# Patient Record
Sex: MALE | Race: WHITE | HISPANIC OR LATINO | Employment: OTHER | URBAN - METROPOLITAN AREA
[De-identification: names, ages, dates, MRNs, and addresses within clinical notes are randomized per-mention and may not be internally consistent; named-entity substitution may affect disease eponyms.]

---

## 2023-04-18 ENCOUNTER — OFFICE VISIT (OUTPATIENT)
Dept: UROLOGY | Facility: CLINIC | Age: 43
End: 2023-04-18

## 2023-04-18 VITALS
HEART RATE: 69 BPM | BODY MASS INDEX: 32.79 KG/M2 | WEIGHT: 229.06 LBS | SYSTOLIC BLOOD PRESSURE: 144 MMHG | DIASTOLIC BLOOD PRESSURE: 77 MMHG | HEIGHT: 70 IN

## 2023-04-18 DIAGNOSIS — E34.9 TESTOSTERONE DEFICIENCY: Primary | ICD-10-CM

## 2023-04-18 DIAGNOSIS — N52.9 VASCULOGENIC ERECTILE DYSFUNCTION, UNSPECIFIED VASCULOGENIC ERECTILE DYSFUNCTION TYPE: ICD-10-CM

## 2023-04-18 PROCEDURE — 99999 PR PBB SHADOW E&M-NEW PATIENT-LVL III: ICD-10-PCS | Mod: PBBFAC,,, | Performed by: UROLOGY

## 2023-04-18 PROCEDURE — 99204 PR OFFICE/OUTPT VISIT, NEW, LEVL IV, 45-59 MIN: ICD-10-PCS | Mod: S$PBB,,, | Performed by: UROLOGY

## 2023-04-18 PROCEDURE — 99203 OFFICE O/P NEW LOW 30 MIN: CPT | Mod: PBBFAC,PN | Performed by: UROLOGY

## 2023-04-18 PROCEDURE — 99999 PR PBB SHADOW E&M-NEW PATIENT-LVL III: CPT | Mod: PBBFAC,,, | Performed by: UROLOGY

## 2023-04-18 PROCEDURE — 99204 OFFICE O/P NEW MOD 45 MIN: CPT | Mod: S$PBB,,, | Performed by: UROLOGY

## 2023-04-18 RX ORDER — SYRINGE, DISPOSABLE, 3 ML
SYRINGE, EMPTY DISPOSABLE MISCELLANEOUS
Qty: 12 EACH | Refills: 4 | Status: SHIPPED | OUTPATIENT
Start: 2023-04-18 | End: 2023-04-19 | Stop reason: SDUPTHER

## 2023-04-18 RX ORDER — TESTOSTERONE CYPIONATE 200 MG/ML
200 INJECTION, SOLUTION INTRAMUSCULAR
Qty: 2 ML | Refills: 3 | Status: SHIPPED | OUTPATIENT
Start: 2023-04-18 | End: 2023-05-15 | Stop reason: SDUPTHER

## 2023-04-18 RX ORDER — NEEDLES, DISPOSABLE 23GX1 1/2"
NEEDLE, DISPOSABLE MISCELLANEOUS
Qty: 12 EACH | Refills: 4 | Status: SHIPPED | OUTPATIENT
Start: 2023-04-18 | End: 2023-04-19 | Stop reason: SDUPTHER

## 2023-04-18 RX ORDER — NEEDLES, DISPOSABLE 25GX5/8"
NEEDLE, DISPOSABLE MISCELLANEOUS
Qty: 12 EACH | Refills: 4 | Status: SHIPPED | OUTPATIENT
Start: 2023-04-18 | End: 2023-04-19 | Stop reason: SDUPTHER

## 2023-04-18 RX ORDER — SILDENAFIL 100 MG/1
TABLET, FILM COATED ORAL
Qty: 30 TABLET | Refills: 6 | Status: SHIPPED | OUTPATIENT
Start: 2023-04-18

## 2023-04-18 NOTE — PROGRESS NOTES
"  CC: low T    History of Present Illness:   Marcelo Mitchell is a 42 y.o. male here for evaluation of low T  .   4/18/23- 43yo male, here for evaluation of low T. He had a recent low level draw by his PCP of 178.   Reports that he drives a lot and doesn't exercise as much as he used to. Libido is good, but erections are not great. Also having Premature ejaculation. Hasn't tried anything. He has started fenofibrate for high tryglycerides.     Review of Systems   Constitutional:  Negative for chills and fever.   Respiratory:  Negative for shortness of breath.    Cardiovascular:  Negative for chest pain.   Gastrointestinal:  Negative for abdominal pain.   All other systems reviewed and are negative.      Past Medical History:   Diagnosis Date    Hypertriglyceridemia        History reviewed. No pertinent surgical history.    Family History   Problem Relation Age of Onset    Hypertension Mother        Social History     Tobacco Use    Smoking status: Some Days     Packs/day: 0.50     Types: Cigarettes   Substance Use Topics    Alcohol use: Yes     Comment: once a week    Drug use: Never       Current Outpatient Medications   Medication Sig Dispense Refill    needle, disp, 18 G (HYPODERMIC NEEDLES) 18 gauge x 1 1/2" Ndle Use as directed 12 each 4    needle, disp, 23 gauge (EASY TOUCH HYPODERMIC NEEDLE) 23 gauge x 1 1/2" Ndle Use as directed 12 each 4    sildenafiL (VIAGRA) 100 MG tablet Take 1 po 1 hour before intercourse 30 tablet 6    syringe, disposable, 3 mL Syrg Use as directed 12 each 4    testosterone cypionate (DEPOTESTOTERONE CYPIONATE) 200 mg/mL injection Inject 1 mL (200 mg total) into the muscle every 14 (fourteen) days. 2 mL 3     No current facility-administered medications for this visit.       Review of patient's allergies indicates:  No Known Allergies    Physical Exam  Vitals:    04/18/23 1505   BP: (!) 144/77   Pulse: 69       General: Well-developed, well-nourished in no acute distress  HEENT: " Normocephalic, atraumatic, Extraocular movements intact  Neck: supple, trachea midline, no cervical or supraclavicular lymphadenopathy  Respirations: even and unlabored  Back: midline spine, no CVA tenderness  Abdomen: soft, Non-tender, non-distended, no organomegaly or palpable masses, no rebound or guarding  : uncircumcised male phallus without lesions, orthotopic urethral meatus, no inguinal hernia, no inguinal lymphadenopathy, no scrotal lesions, testicles descended bilaterally without mass or tenderness  Extremities: atraumatic, moves all equally, no clubbing, cyanosis or edema  Psych: normal affect  Skin: warm and dry, no lesions  Neuro: Alert and oriented, Cranial nerves II-XII grossly intact      Assessment:   1. Testosterone deficiency  Prostate Specific Antigen, Diagnostic    Prolactin    Luteinizing Hormone    CBC Without Differential    Comprehensive Metabolic Panel    Prostate Specific Antigen, Diagnostic    Testosterone    Lipid Panel      2. Vasculogenic erectile dysfunction, unspecified vasculogenic erectile dysfunction type            Plan:  Testosterone deficiency  -     Prostate Specific Antigen, Diagnostic; Future; Expected date: 04/18/2023  -     Prolactin; Future; Expected date: 04/18/2023  -     Luteinizing Hormone; Future; Expected date: 04/18/2023  -     CBC Without Differential; Future; Expected date: 07/13/2023  -     Comprehensive Metabolic Panel; Future; Expected date: 07/13/2023  -     Prostate Specific Antigen, Diagnostic; Future; Expected date: 07/13/2023  -     Testosterone; Future; Expected date: 07/13/2023  -     Lipid Panel; Future; Expected date: 07/13/2023    Vasculogenic erectile dysfunction, unspecified vasculogenic erectile dysfunction type    Other orders  -     testosterone cypionate (DEPOTESTOTERONE CYPIONATE) 200 mg/mL injection; Inject 1 mL (200 mg total) into the muscle every 14 (fourteen) days.  Dispense: 2 mL; Refill: 3  -     syringe, disposable, 3 mL Syrg; Use as  "directed  Dispense: 12 each; Refill: 4  -     needle, disp, 18 G (HYPODERMIC NEEDLES) 18 gauge x 1 1/2" Ndle; Use as directed  Dispense: 12 each; Refill: 4  -     needle, disp, 23 gauge (EASY TOUCH HYPODERMIC NEEDLE) 23 gauge x 1 1/2" Ndle; Use as directed  Dispense: 12 each; Refill: 4  -     sildenafiL (VIAGRA) 100 MG tablet; Take 1 po 1 hour before intercourse  Dispense: 30 tablet; Refill: 6    I had a detailed discussion with the patient, reviewing the diagnosis and management of testosterone deficiency. Also explained that addressing his low T would require routine lab testing to ensure that his blood counts remain stable. We discussed the specific risks/benefits of each type of exogenous testosterone, including the risk of transference with topical treatments. We discussed the data as it relates to cardiovascular health and risk. We also discussed that while there is no evidence linking testosterone therapy to the development of prostate cancer, there could be risk in an individual with prostate cancer, as it relates to prostate cancer growth.       Follow up in about 3 months (around 7/18/2023) for labs before visit.                  "

## 2023-04-19 ENCOUNTER — TELEPHONE (OUTPATIENT)
Dept: UROLOGY | Facility: CLINIC | Age: 43
End: 2023-04-19

## 2023-04-19 ENCOUNTER — LAB VISIT (OUTPATIENT)
Dept: LAB | Facility: HOSPITAL | Age: 43
End: 2023-04-19
Attending: UROLOGY

## 2023-04-19 DIAGNOSIS — E34.9 TESTOSTERONE DEFICIENCY: ICD-10-CM

## 2023-04-19 PROCEDURE — 83002 ASSAY OF GONADOTROPIN (LH): CPT | Performed by: UROLOGY

## 2023-04-19 PROCEDURE — 84153 ASSAY OF PSA TOTAL: CPT | Performed by: UROLOGY

## 2023-04-19 PROCEDURE — 36415 COLL VENOUS BLD VENIPUNCTURE: CPT | Mod: PN | Performed by: UROLOGY

## 2023-04-19 PROCEDURE — 85027 COMPLETE CBC AUTOMATED: CPT | Performed by: UROLOGY

## 2023-04-19 PROCEDURE — 84146 ASSAY OF PROLACTIN: CPT | Performed by: UROLOGY

## 2023-04-19 RX ORDER — NEEDLES, DISPOSABLE 23GX1 1/2"
NEEDLE, DISPOSABLE MISCELLANEOUS
Qty: 12 EACH | Refills: 4 | Status: SHIPPED | OUTPATIENT
Start: 2023-04-19

## 2023-04-19 RX ORDER — SYRINGE, DISPOSABLE, 3 ML
SYRINGE, EMPTY DISPOSABLE MISCELLANEOUS
Qty: 12 EACH | Refills: 4 | Status: SHIPPED | OUTPATIENT
Start: 2023-04-19

## 2023-04-19 RX ORDER — NEEDLES, DISPOSABLE 25GX5/8"
NEEDLE, DISPOSABLE MISCELLANEOUS
Qty: 12 EACH | Refills: 4 | Status: SHIPPED | OUTPATIENT
Start: 2023-04-19

## 2023-04-19 NOTE — TELEPHONE ENCOUNTER
----- Message from Krista Gillette LPN sent at 4/19/2023  4:37 PM CDT -----  Contact: Martha/ Daughter  Patient needs needles to be sent in to pharmacy.  He has the medication.  ----- Message -----  From: Marie Kerr  Sent: 4/19/2023   2:48 PM CDT  To: Charles Thomas stated the patient didn't get the Hyperdermic needles like he needed. Please call and advise at [phone]. She stated without them he cannot take this Testosterone.

## 2023-04-20 ENCOUNTER — TELEPHONE (OUTPATIENT)
Dept: UROLOGY | Facility: CLINIC | Age: 43
End: 2023-04-20

## 2023-04-20 LAB
COMPLEXED PSA SERPL-MCNC: 0.33 NG/ML (ref 0–4)
ERYTHROCYTE [DISTWIDTH] IN BLOOD BY AUTOMATED COUNT: 12 % (ref 11.5–14.5)
HCT VFR BLD AUTO: 45.8 % (ref 40–54)
HGB BLD-MCNC: 14.8 G/DL (ref 14–18)
LH SERPL-ACNC: 3.1 MIU/ML (ref 0.6–12.1)
MCH RBC QN AUTO: 29 PG (ref 27–31)
MCHC RBC AUTO-ENTMCNC: 32.3 G/DL (ref 32–36)
MCV RBC AUTO: 90 FL (ref 82–98)
PLATELET # BLD AUTO: 282 K/UL (ref 150–450)
PMV BLD AUTO: 12.6 FL (ref 9.2–12.9)
PROLACTIN SERPL IA-MCNC: 7.7 NG/ML (ref 3.5–19.4)
RBC # BLD AUTO: 5.11 M/UL (ref 4.6–6.2)
WBC # BLD AUTO: 9.29 K/UL (ref 3.9–12.7)

## 2023-04-20 NOTE — TELEPHONE ENCOUNTER
----- Message from Franchesca Soto MD sent at 4/19/2023  5:21 PM CDT -----  Contact: Martha/ Daughter  I just sent the needles again.   ----- Message -----  From: Krista Gillette LPN  Sent: 4/19/2023   4:38 PM CDT  To: Franchesca Soto MD    Patient needs needles to be sent in to pharmacy.  He has the medication.  ----- Message -----  From: Mraie Kerr  Sent: 4/19/2023   2:48 PM CDT  To: Charles Agosto    Martha stated the patient didn't get the Hyperdermic needles like he needed. Please call and advise at [phone]. She stated without them he cannot take this Testosterone.

## 2023-05-16 RX ORDER — TESTOSTERONE CYPIONATE 200 MG/ML
200 INJECTION, SOLUTION INTRAMUSCULAR
Qty: 2 ML | Refills: 3 | Status: SHIPPED | OUTPATIENT
Start: 2023-05-16 | End: 2023-11-14